# Patient Record
Sex: FEMALE | Race: WHITE | NOT HISPANIC OR LATINO | Employment: FULL TIME | URBAN - METROPOLITAN AREA
[De-identification: names, ages, dates, MRNs, and addresses within clinical notes are randomized per-mention and may not be internally consistent; named-entity substitution may affect disease eponyms.]

---

## 2017-05-11 ENCOUNTER — HOSPITAL ENCOUNTER (OUTPATIENT)
Dept: RADIOLOGY | Facility: HOSPITAL | Age: 27
Discharge: HOME | End: 2017-05-11
Attending: NURSE PRACTITIONER
Payer: COMMERCIAL

## 2017-05-11 DIAGNOSIS — R20.2 PARESTHESIA OF FOOT, BILATERAL: ICD-10-CM

## 2017-05-11 DIAGNOSIS — R20.2 PARESTHESIA OF FOOT, BILATERAL: Primary | ICD-10-CM

## 2017-05-11 PROCEDURE — 72100 X-RAY EXAM L-S SPINE 2/3 VWS: CPT | Mod: 26,,, | Performed by: RADIOLOGY

## 2017-05-11 PROCEDURE — 72050 X-RAY EXAM NECK SPINE 4/5VWS: CPT | Mod: 26,,, | Performed by: RADIOLOGY

## 2017-05-11 PROCEDURE — 72050 X-RAY EXAM NECK SPINE 4/5VWS: CPT | Mod: TC,PO

## 2017-05-11 PROCEDURE — 72100 X-RAY EXAM L-S SPINE 2/3 VWS: CPT | Mod: TC,PO

## 2017-12-01 ENCOUNTER — HOSPITAL ENCOUNTER (EMERGENCY)
Facility: HOSPITAL | Age: 27
Discharge: HOME | End: 2017-12-01
Attending: EMERGENCY MEDICINE
Payer: COMMERCIAL

## 2017-12-01 DIAGNOSIS — R10.9 RIGHT FLANK PAIN: ICD-10-CM

## 2017-12-01 DIAGNOSIS — N23 RENAL COLIC ON RIGHT SIDE: Primary | ICD-10-CM

## 2017-12-01 LAB
ALBUMIN SERPL BCP-MCNC: 4.8 G/DL
ALP SERPL-CCNC: 94 U/L
ALT SERPL W/O P-5'-P-CCNC: 21 U/L
ANION GAP SERPL CALC-SCNC: 14 MMOL/L
AST SERPL-CCNC: 24 U/L
B-HCG UR QL: NEGATIVE
BACTERIA #/AREA URNS AUTO: ABNORMAL /HPF
BASOPHILS # BLD AUTO: 0.04 K/UL
BASOPHILS NFR BLD: 0.6 %
BILIRUB SERPL-MCNC: 1.4 MG/DL
BILIRUB UR QL STRIP: NEGATIVE
BUN SERPL-MCNC: 12 MG/DL
CALCIUM SERPL-MCNC: 10.3 MG/DL
CHLORIDE SERPL-SCNC: 103 MMOL/L
CLARITY UR REFRACT.AUTO: CLEAR
CO2 SERPL-SCNC: 24 MMOL/L
COLOR UR AUTO: YELLOW
CREAT SERPL-MCNC: 0.7 MG/DL
DIFFERENTIAL METHOD: ABNORMAL
EOSINOPHIL # BLD AUTO: 0.1 K/UL
EOSINOPHIL NFR BLD: 1.2 %
ERYTHROCYTE [DISTWIDTH] IN BLOOD BY AUTOMATED COUNT: 11.9 %
EST. GFR  (AFRICAN AMERICAN): >60 ML/MIN/1.73 M^2
EST. GFR  (NON AFRICAN AMERICAN): >60 ML/MIN/1.73 M^2
GLUCOSE SERPL-MCNC: 80 MG/DL
GLUCOSE UR QL STRIP: NEGATIVE
HCT VFR BLD AUTO: 42.1 %
HGB BLD-MCNC: 14.7 G/DL
HGB UR QL STRIP: ABNORMAL
HYALINE CASTS UR QL AUTO: 0 /LPF
KETONES UR QL STRIP: NEGATIVE
LEUKOCYTE ESTERASE UR QL STRIP: NEGATIVE
LYMPHOCYTES # BLD AUTO: 2.8 K/UL
LYMPHOCYTES NFR BLD: 43 %
MCH RBC QN AUTO: 32.2 PG
MCHC RBC AUTO-ENTMCNC: 34.9 G/DL
MCV RBC AUTO: 92 FL
MICROSCOPIC COMMENT: ABNORMAL
MONOCYTES # BLD AUTO: 0.5 K/UL
MONOCYTES NFR BLD: 8 %
NEUTROPHILS # BLD AUTO: 3 K/UL
NEUTROPHILS NFR BLD: 47 %
NITRITE UR QL STRIP: NEGATIVE
NON-SQ EPI CELLS #/AREA URNS AUTO: 2 /HPF
PH UR STRIP: 6 [PH] (ref 5–8)
PLATELET # BLD AUTO: 223 K/UL
PMV BLD AUTO: 11.2 FL
POTASSIUM SERPL-SCNC: 3.7 MMOL/L
PROT SERPL-MCNC: 8.4 G/DL
PROT UR QL STRIP: NEGATIVE
RBC # BLD AUTO: 4.56 M/UL
RBC #/AREA URNS AUTO: 0 /HPF (ref 0–4)
SODIUM SERPL-SCNC: 141 MMOL/L
SP GR UR STRIP: <=1.005 (ref 1–1.03)
SQUAMOUS #/AREA URNS AUTO: 0 /HPF
URN SPEC COLLECT METH UR: ABNORMAL
UROBILINOGEN UR STRIP-ACNC: NEGATIVE EU/DL
WBC # BLD AUTO: 6.47 K/UL
WBC #/AREA URNS AUTO: 0 /HPF (ref 0–5)

## 2017-12-01 PROCEDURE — 81025 URINE PREGNANCY TEST: CPT

## 2017-12-01 PROCEDURE — 85025 COMPLETE CBC W/AUTO DIFF WBC: CPT

## 2017-12-01 PROCEDURE — 96376 TX/PRO/DX INJ SAME DRUG ADON: CPT

## 2017-12-01 PROCEDURE — 25000003 PHARM REV CODE 250: Performed by: EMERGENCY MEDICINE

## 2017-12-01 PROCEDURE — 63600175 PHARM REV CODE 636 W HCPCS: Performed by: EMERGENCY MEDICINE

## 2017-12-01 PROCEDURE — 99285 EMERGENCY DEPT VISIT HI MDM: CPT | Mod: 25

## 2017-12-01 PROCEDURE — 96374 THER/PROPH/DIAG INJ IV PUSH: CPT

## 2017-12-01 PROCEDURE — 96361 HYDRATE IV INFUSION ADD-ON: CPT

## 2017-12-01 PROCEDURE — 96375 TX/PRO/DX INJ NEW DRUG ADDON: CPT

## 2017-12-01 PROCEDURE — 81000 URINALYSIS NONAUTO W/SCOPE: CPT

## 2017-12-01 PROCEDURE — 80053 COMPREHEN METABOLIC PANEL: CPT

## 2017-12-01 RX ORDER — MORPHINE SULFATE 2 MG/ML
2 INJECTION, SOLUTION INTRAMUSCULAR; INTRAVENOUS
Status: COMPLETED | OUTPATIENT
Start: 2017-12-01 | End: 2017-12-01

## 2017-12-01 RX ORDER — ONDANSETRON 4 MG/1
4 TABLET, ORALLY DISINTEGRATING ORAL EVERY 6 HOURS PRN
Qty: 12 TABLET | Refills: 0 | Status: SHIPPED | OUTPATIENT
Start: 2017-12-01 | End: 2018-10-05

## 2017-12-01 RX ORDER — HYDROCODONE BITARTRATE AND ACETAMINOPHEN 7.5; 325 MG/1; MG/1
1 TABLET ORAL EVERY 6 HOURS PRN
Qty: 18 TABLET | Refills: 0 | Status: SHIPPED | OUTPATIENT
Start: 2017-12-01 | End: 2018-10-05

## 2017-12-01 RX ORDER — ONDANSETRON 2 MG/ML
4 INJECTION INTRAMUSCULAR; INTRAVENOUS ONCE
Status: COMPLETED | OUTPATIENT
Start: 2017-12-01 | End: 2017-12-01

## 2017-12-01 RX ORDER — KETOROLAC TROMETHAMINE 30 MG/ML
30 INJECTION, SOLUTION INTRAMUSCULAR; INTRAVENOUS
Status: COMPLETED | OUTPATIENT
Start: 2017-12-01 | End: 2017-12-01

## 2017-12-01 RX ORDER — TAMSULOSIN HYDROCHLORIDE 0.4 MG/1
0.4 CAPSULE ORAL DAILY
COMMUNITY
End: 2018-10-05

## 2017-12-01 RX ORDER — ONDANSETRON 4 MG/1
8 TABLET, FILM COATED ORAL 2 TIMES DAILY
COMMUNITY
End: 2018-10-05

## 2017-12-01 RX ORDER — TAMSULOSIN HYDROCHLORIDE 0.4 MG/1
0.4 CAPSULE ORAL DAILY
Qty: 30 CAPSULE | Refills: 0 | Status: SHIPPED | OUTPATIENT
Start: 2017-12-01 | End: 2017-12-31

## 2017-12-01 RX ORDER — OXYCODONE AND ACETAMINOPHEN 5; 325 MG/1; MG/1
1 TABLET ORAL EVERY 4 HOURS PRN
COMMUNITY
End: 2017-12-01

## 2017-12-01 RX ORDER — KETOROLAC TROMETHAMINE 10 MG/1
10 TABLET, FILM COATED ORAL EVERY 6 HOURS PRN
Qty: 16 TABLET | Refills: 0 | Status: SHIPPED | OUTPATIENT
Start: 2017-12-01 | End: 2017-12-17

## 2017-12-01 RX ADMIN — KETOROLAC TROMETHAMINE 30 MG: 30 INJECTION, SOLUTION INTRAMUSCULAR at 08:12

## 2017-12-01 RX ADMIN — Medication 2 MG: at 07:12

## 2017-12-01 RX ADMIN — SODIUM CHLORIDE 1000 ML: 0.9 INJECTION, SOLUTION INTRAVENOUS at 07:12

## 2017-12-01 RX ADMIN — ONDANSETRON 4 MG: 2 INJECTION, SOLUTION INTRAMUSCULAR; INTRAVENOUS at 07:12

## 2017-12-01 RX ADMIN — Medication 2 MG: at 09:12

## 2017-12-02 VITALS
TEMPERATURE: 98 F | SYSTOLIC BLOOD PRESSURE: 118 MMHG | DIASTOLIC BLOOD PRESSURE: 69 MMHG | OXYGEN SATURATION: 99 % | BODY MASS INDEX: 18.5 KG/M2 | WEIGHT: 98 LBS | HEIGHT: 61 IN | RESPIRATION RATE: 18 BRPM | HEART RATE: 71 BPM

## 2017-12-02 NOTE — DISCHARGE INSTRUCTIONS
Return to emergency department for nausea, vomiting, worsening pain, fever, or worsening condition.  Drink plenty of fluids.

## 2017-12-02 NOTE — ED NOTES
Pt c/o R flank and lower back pain since approx 11:30 pm. Pt reports taking Percocet and flomax at approx 1 pm without relief of pain. Hx of kidney stones and lithotripsy. Pt reports that it feels like previous kidney stones except for when she lifts and moves her legs the pain increases. With prior episodes, this did not occur. Pt also reports urinary frequency and nausea.      Level of Consciousness: Patient is awake, alert, oriented to person, place, time, and situation.    Appearance: Pt resting comfortably in stretcher, no acute distress at this time. Clothing appropriately placed and clean. Hygiene is appropriate.   Skin: Skin is warm, dry, and intact. Skin turgor is normal/elastic. Mucous membranes moist. Skin color is normal for ethnicity. No skin breakdown noted.  Musculoskeletal: Moves all extremities well. Full active ROM. No deformities noted. Denies any weakness. Gait steady, ambulates without use of assistive devices. Pain to R flank and lower back worsens with lifting and moving legs.   Respiratory: Airway open and patent. Respirations equal and unlabored. Breath sounds clear to auscultation. Denies any SOB.   Cardiac: Regular rate and rhythm. No peripheral edema noted. Radial and pedal pulses present and normal. Capillary refill is within normal limits. Denies chest pain.    GI: Abdomen soft, non-tender to all quadrants with palpitation. Bowel sounds present and active in all quads. Abdomen symmetric with no distention noted. Denies any V/D. Reports nausea. R flank and lower back pain. Mild R flank tenderness.   Neurological: Symmetrical expressions noted to face. Equal bilateral . Normal sensation reported to all extremities. No obvious neurological deficits noted.   Psychosocial: Speech spontaneous, clear, and coherent. Appropriate to situation. Family at bedside. Pt is calm and cooperative. Appears uncomfortable. Moving from position to position trying to find comfort.      Pt informed of  plan of care, verbalizes understanding, and denies any other questions, complaints, or concerns at this time. Bed in locked in lowest position, siderails up x2, call light within reach.  Will continue to monitor.

## 2017-12-02 NOTE — ED PROVIDER NOTES
Encounter Date: 12/1/2017       History     Chief Complaint   Patient presents with    Flank Pain     c/o right flank      CHIEF COMPLIANT: Flank Pain (c/o right flank )      12/1/2017, 7:25 PM     The history is provided by the patient and mother. Kalli Barr is a 26 y.o. female presenting to the ED for right sided flank pain.  Patient has a history of bilaterally kidney stones.  She is followed by Dr. Lacie Lenz.   Onset of pain:  11:00 am.   Pain is rated as moderate.  Pain is located in the right flank.  Patient has the urge to go the bathroom more frequently.  She denies any dysuria, urgency, or fever.  Is associated with nausea.  It is worsened by raising her right leg.  Better with rest.  Prior treatment includes Flomax and Percocet 5 mg.  Patient denies any fevers, chills, chest pain, chest pressure, shortness of breath.  Patient is currently breast-feeding.    PCP: Minerva West MD  Specialist:             Review of patient's allergies indicates:   Allergen Reactions    Cefaclor Hives     Past Medical History:   Diagnosis Date    Kidney stones     Renal stone     VSD (ventricular septal defect), perimembranous      Past Surgical History:   Procedure Laterality Date    ELBOW FRACTURE SURGERY Right     LITHOTRIPSY       Family History   Problem Relation Age of Onset    No Known Problems Mother     No Known Problems Father     No Known Problems Sister     No Known Problems Brother     No Known Problems Maternal Aunt     No Known Problems Maternal Uncle     No Known Problems Paternal Aunt     No Known Problems Paternal Uncle     Hyperlipidemia Maternal Grandmother     Heart disease Maternal Grandfather     Diabetes Maternal Grandfather     Hyperlipidemia Maternal Grandfather     No Known Problems Paternal Grandmother     No Known Problems Paternal Grandfather     Anemia Neg Hx     Arrhythmia Neg Hx     Asthma Neg Hx     Clotting disorder Neg Hx     Fainting Neg Hx      Heart attack Neg Hx     Heart failure Neg Hx     Hypertension Neg Hx     Stroke Neg Hx     Atrial Septal Defect Neg Hx     Congenital heart disease Neg Hx      Social History   Substance Use Topics    Smoking status: Never Smoker    Smokeless tobacco: Never Used    Alcohol use Yes      Comment: socially     Review of Systems   Constitutional: Negative for fever.   HENT: Negative for sore throat.    Respiratory: Negative for shortness of breath.    Cardiovascular: Negative for chest pain.   Gastrointestinal: Negative for nausea.   Genitourinary: Positive for frequency. Negative for dysuria, hematuria, pelvic pain and urgency.   Musculoskeletal: Positive for back pain (right sided flank pain).   Skin: Negative for rash.   Neurological: Negative for weakness.   Hematological: Does not bruise/bleed easily.       Physical Exam     Initial Vitals [12/01/17 1912]   BP Pulse Resp Temp SpO2   137/80 65 20 98.2 °F (36.8 °C) 100 %      MAP       99         Physical Exam    Nursing note and vitals reviewed.  Constitutional: She appears well-developed and well-nourished.   HENT:   Head: Normocephalic and atraumatic.   Eyes: Conjunctivae and EOM are normal. Pupils are equal, round, and reactive to light.   Neck: Normal range of motion.   Cardiovascular: Normal rate, regular rhythm and normal heart sounds.   Pulmonary/Chest: Breath sounds normal. No respiratory distress.   Abdominal: Soft. Bowel sounds are normal. She exhibits no mass. There is no rebound and no guarding.   Genitourinary:   Genitourinary Comments: Right sided CVA tenderness   Musculoskeletal: Normal range of motion.   Neurological: She is alert and oriented to person, place, and time. She has normal strength. No cranial nerve deficit.   Cranial nerves II-XII intact   Skin: Skin is warm and dry.   Psychiatric: She has a normal mood and affect. Her speech is normal and behavior is normal. Thought content normal.         ED Course   Procedures  Labs Reviewed  "  CBC W/ AUTO DIFFERENTIAL - Abnormal; Notable for the following:        Result Value    MCH 32.2 (*)     All other components within normal limits   COMPREHENSIVE METABOLIC PANEL - Abnormal; Notable for the following:     Total Bilirubin 1.4 (*)     All other components within normal limits   URINALYSIS - Abnormal; Notable for the following:     Specific Gravity, UA <=1.005 (*)     Occult Blood UA 2+ (*)     All other components within normal limits   URINALYSIS MICROSCOPIC - Abnormal; Notable for the following:     Non-Squam Epith 2 (*)     All other components within normal limits   PREGNANCY TEST, URINE RAPID                               ED Course      Vitals:    12/01/17 1912 12/01/17 2031   BP: 137/80 116/70   Pulse: 65 62   Resp: 20    Temp: 98.2 °F (36.8 °C) 98.3 °F (36.8 °C)   TempSrc: Oral Oral   SpO2: 100% 100%   Weight: 44.5 kg (98 lb)    Height: 5' 1" (1.549 m)      Results for orders placed or performed during the hospital encounter of 12/01/17   CBC auto differential   Result Value Ref Range    WBC 6.47 3.90 - 12.70 K/uL    RBC 4.56 4.00 - 5.40 M/uL    Hemoglobin 14.7 12.0 - 16.0 g/dL    Hematocrit 42.1 37.0 - 48.5 %    MCV 92 82 - 98 fL    MCH 32.2 (H) 27.0 - 31.0 pg    MCHC 34.9 32.0 - 36.0 g/dL    RDW 11.9 11.5 - 14.5 %    Platelets 223 150 - 350 K/uL    MPV 11.2 9.2 - 12.9 fL    Gran # 3.0 1.8 - 7.7 K/uL    Lymph # 2.8 1.0 - 4.8 K/uL    Mono # 0.5 0.3 - 1.0 K/uL    Eos # 0.1 0.0 - 0.5 K/uL    Baso # 0.04 0.00 - 0.20 K/uL    Gran% 47.0 38.0 - 73.0 %    Lymph% 43.0 18.0 - 48.0 %    Mono% 8.0 4.0 - 15.0 %    Eosinophil% 1.2 0.0 - 8.0 %    Basophil% 0.6 0.0 - 1.9 %    Differential Method Automated    Comprehensive metabolic panel   Result Value Ref Range    Sodium 141 136 - 145 mmol/L    Potassium 3.7 3.5 - 5.1 mmol/L    Chloride 103 95 - 110 mmol/L    CO2 24 23 - 29 mmol/L    Glucose 80 70 - 110 mg/dL    BUN, Bld 12 6 - 20 mg/dL    Creatinine 0.7 0.5 - 1.4 mg/dL    Calcium 10.3 8.7 - 10.5 mg/dL    " Total Protein 8.4 6.0 - 8.4 g/dL    Albumin 4.8 3.5 - 5.2 g/dL    Total Bilirubin 1.4 (H) 0.1 - 1.0 mg/dL    Alkaline Phosphatase 94 55 - 135 U/L    AST 24 10 - 40 U/L    ALT 21 10 - 44 U/L    Anion Gap 14 8 - 16 mmol/L    eGFR if African American >60.0 >60 mL/min/1.73 m^2    eGFR if non African American >60.0 >60 mL/min/1.73 m^2   Urinalysis   Result Value Ref Range    Specimen UA Urine, Clean Catch     Color, UA Yellow Yellow, Straw, Della    Appearance, UA Clear Clear    pH, UA 6.0 5.0 - 8.0    Specific Gravity, UA <=1.005 (A) 1.005 - 1.030    Protein, UA Negative Negative    Glucose, UA Negative Negative    Ketones, UA Negative Negative    Bilirubin (UA) Negative Negative    Occult Blood UA 2+ (A) Negative    Nitrite, UA Negative Negative    Urobilinogen, UA Negative <2.0 EU/dL    Leukocytes, UA Negative Negative   Pregnancy, urine rapid   Result Value Ref Range    Preg Test, Ur Negative    Urinalysis Microscopic   Result Value Ref Range    RBC, UA 0 0 - 4 /hpf    WBC, UA 0 0 - 5 /hpf    Bacteria, UA None None-Occ /hpf    Squam Epithel, UA 0 /hpf    Non-Squam Epith 2 (A) <1/hpf /hpf    Hyaline Casts, UA 0 0-1/lpf /lpf    Microscopic Comment SEE COMMENT        Imaging Results          X-Ray Abdomen AP 1 View (KUB) (Final result)  Result time 12/01/17 21:01:17    Final result by Lyndon Savage MD (12/01/17 21:01:17)                 Impression:     Unremarkable exam.      Electronically signed by: LYNDON SAVAGE MD  Date:     12/01/17  Time:    21:01              Narrative:    Examination: Abdomen, 1 view.    History:     Abdominal pain.    Findings:     Bowel gas pattern is within normal limits. No suspicious calcifications. There is mild convex left scoliosis, likely positional.                             CT Renal Stone Study ABD Pelvis WO (Final result)  Result time 12/01/17 20:18:24    Final result by Lyndon Savage MD (12/01/17 20:18:24)                 Impression:     4 mm stone proximal left ureter,  with marked hydronephrosis. See above. UPJ or proximal ureteric stenosis not excluded.      Electronically signed by: DRE NELSON MD  Date:     12/01/17  Time:    20:18              Narrative:    Examination: CT of the abdomen and pelvis without contrast.    History:    Unspecified abdominal pain    Findings: A 4 mm stone is present in the proximal right ureter at the level of the L4 vertebral body. There is a marked degree of hydronephrosis, the degree of which is more than expected for the size of the stone, raising the question of underlying UPJ or proximal ureteric stenosis. No perinephric stranding.    Note also made of several small nonobstructing renal stones bilaterally.    The liver, spleen, pancreas and adrenal glands are unremarkable, given lack of IV contrast.    The appendix is unremarkable. Visualized lung bases are clear.    Bladder contour is within normal limits.                            Medications   sodium chloride 0.9% bolus 1,000 mL (0 mLs Intravenous Stopped 12/1/17 2137)   ondansetron injection 4 mg (4 mg Intravenous Given 12/1/17 1930)   morphine injection 2 mg (2 mg Intravenous Given 12/1/17 1938)   ketorolac injection 30 mg (30 mg Intravenous Given 12/1/17 2002)   morphine injection 2 mg (2 mg Intravenous Given 12/1/17 2141)       9:48 PM Reassessment: Dr. Doyle reassessed the pt.  The pt is resting comfortably and is NAD.  Pt states their sx have improved. Discussed test results, shared treatment plan, specific conditions for return, and the need for f/u.  Answered their questions at this time.  Pt understands and agrees to the plan.  The pt has remained hemodynamically stable through ED course and is stable for discharge.     Follow-up Information     Lacie Lenz Jr, MD. Schedule an appointment as soon as possible for a visit in 2 days.    Specialty:  Urology  Contact information:  [address]  [facility] [address] [phone]                     New Prescriptions     HYDROCODONE-ACETAMINOPHEN 7.5-325MG (NORCO) 7.5-325 MG PER TABLET    Take 1 tablet by mouth every 6 (six) hours as needed for Pain.    KETOROLAC (TORADOL) 10 MG TABLET    Take 1 tablet (10 mg total) by mouth every 6 (six) hours as needed for Pain.    ONDANSETRON (ZOFRAN-ODT) 4 MG TBDL    Take 1 tablet (4 mg total) by mouth every 6 (six) hours as needed (nausea).    TAMSULOSIN (FLOMAX) 0.4 MG CP24    Take 1 capsule (0.4 mg total) by mouth once daily.        Discontinued Medications    OXYCODONE-ACETAMINOPHEN (PERCOCET) 5-325 MG PER TABLET    Take 1 tablet by mouth every 4 (four) hours as needed for Pain.         Pre-hypertension/Hypertension: The pt has been informed that they may have pre-hypertension or hypertension based on a blood pressure reading in the ED. I recommend that the pt call the PCP listed on their discharge instructions or a physician of their choice this week to arrange f/u for further evaluation of possible pre-hypertension or hypertension.     Clinical Impression:       ICD-10-CM ICD-9-CM   1. Renal colic on right side - 4 mm stone at L4 N23 788.0   2. Right flank pain R10.9 789.09         Disposition:   Disposition: Discharged  Condition: Stable                        Glendy Doyle,   12/01/17 3534

## 2017-12-02 NOTE — ED NOTES
Pt sitting up in stretcher, reports pain has increased, but still is not as bad as it was when she got here. Will administer morphine prior to discharge. Updated on plan of care. Verbalizes understanding. Questions regarding breastfeeding on prescribed medication answered by MD. No Further questions or concerns at this time.

## 2018-10-05 ENCOUNTER — HOSPITAL ENCOUNTER (EMERGENCY)
Facility: HOSPITAL | Age: 28
Discharge: HOME | End: 2018-10-05
Attending: EMERGENCY MEDICINE
Payer: COMMERCIAL

## 2018-10-05 VITALS
DIASTOLIC BLOOD PRESSURE: 105 MMHG | WEIGHT: 111 LBS | RESPIRATION RATE: 16 BRPM | BODY MASS INDEX: 20.97 KG/M2 | SYSTOLIC BLOOD PRESSURE: 160 MMHG | OXYGEN SATURATION: 99 % | HEART RATE: 88 BPM | TEMPERATURE: 99 F

## 2018-10-05 DIAGNOSIS — Z86.59 HISTORY OF ANXIETY: ICD-10-CM

## 2018-10-05 DIAGNOSIS — Q21.0 VENTRICULAR SEPTAL DEFECT (VSD): ICD-10-CM

## 2018-10-05 DIAGNOSIS — R03.0 ELEVATED BLOOD PRESSURE READING WITHOUT DIAGNOSIS OF HYPERTENSION: Primary | ICD-10-CM

## 2018-10-05 LAB
ALBUMIN SERPL BCP-MCNC: 4.1 G/DL
ALP SERPL-CCNC: 75 U/L
ALT SERPL W/O P-5'-P-CCNC: 12 U/L
ANION GAP SERPL CALC-SCNC: 10 MMOL/L
AST SERPL-CCNC: 17 U/L
BASOPHILS # BLD AUTO: 0.03 K/UL
BASOPHILS NFR BLD: 0.4 %
BILIRUB SERPL-MCNC: 0.6 MG/DL
BUN SERPL-MCNC: 12 MG/DL
CALCIUM SERPL-MCNC: 10 MG/DL
CHLORIDE SERPL-SCNC: 106 MMOL/L
CO2 SERPL-SCNC: 26 MMOL/L
CREAT SERPL-MCNC: 0.7 MG/DL
DIFFERENTIAL METHOD: ABNORMAL
EOSINOPHIL # BLD AUTO: 0.2 K/UL
EOSINOPHIL NFR BLD: 2.6 %
ERYTHROCYTE [DISTWIDTH] IN BLOOD BY AUTOMATED COUNT: 11.9 %
EST. GFR  (AFRICAN AMERICAN): >60 ML/MIN/1.73 M^2
EST. GFR  (NON AFRICAN AMERICAN): >60 ML/MIN/1.73 M^2
GLUCOSE SERPL-MCNC: 98 MG/DL
HCT VFR BLD AUTO: 41.9 %
HGB BLD-MCNC: 15 G/DL
LYMPHOCYTES # BLD AUTO: 3.4 K/UL
LYMPHOCYTES NFR BLD: 46.8 %
MCH RBC QN AUTO: 32.8 PG
MCHC RBC AUTO-ENTMCNC: 35.8 G/DL
MCV RBC AUTO: 92 FL
MONOCYTES # BLD AUTO: 0.6 K/UL
MONOCYTES NFR BLD: 8 %
NEUTROPHILS # BLD AUTO: 3 K/UL
NEUTROPHILS NFR BLD: 42.1 %
PLATELET # BLD AUTO: 204 K/UL
PMV BLD AUTO: 12.4 FL
POTASSIUM SERPL-SCNC: 3.3 MMOL/L
PROT SERPL-MCNC: 7.7 G/DL
RBC # BLD AUTO: 4.58 M/UL
SODIUM SERPL-SCNC: 142 MMOL/L
WBC # BLD AUTO: 7.22 K/UL

## 2018-10-05 PROCEDURE — 80053 COMPREHEN METABOLIC PANEL: CPT

## 2018-10-05 PROCEDURE — 85025 COMPLETE CBC W/AUTO DIFF WBC: CPT

## 2018-10-05 PROCEDURE — 25000003 PHARM REV CODE 250: Performed by: EMERGENCY MEDICINE

## 2018-10-05 PROCEDURE — 99285 EMERGENCY DEPT VISIT HI MDM: CPT | Mod: 25

## 2018-10-05 PROCEDURE — 93010 ELECTROCARDIOGRAM REPORT: CPT | Mod: ,,, | Performed by: INTERNAL MEDICINE

## 2018-10-05 PROCEDURE — 93005 ELECTROCARDIOGRAM TRACING: CPT

## 2018-10-05 PROCEDURE — 99900035 HC TECH TIME PER 15 MIN (STAT)

## 2018-10-05 RX ORDER — ETHYNODIOL DIACETATE AND ETHINYL ESTRADIOL 1 MG-35MCG
1 KIT ORAL DAILY
COMMUNITY
End: 2018-11-08

## 2018-10-05 RX ORDER — HYDROXYZINE PAMOATE 25 MG/1
25 CAPSULE ORAL
Status: COMPLETED | OUTPATIENT
Start: 2018-10-05 | End: 2018-10-05

## 2018-10-05 RX ADMIN — HYDROXYZINE PAMOATE 25 MG: 25 CAPSULE ORAL at 10:10

## 2018-10-06 NOTE — ED PROVIDER NOTES
"Encounter Date: 10/5/2018       History     Chief Complaint   Patient presents with    Hypertension     Patient presents to the emergency department with complaints of hypertension and anxiety.  She states that she just got over a sinus infection and completed a course of antibiotics (Z-percy) and steroids.  She admits to taking a "Motrin Sinus" today at 1400 hours.  The patient also has c/o intermittent palpitation and feeling "short winded".     Patient currently presents to the emergency room with concerns regarding hypertension.  She notes that she has been feeling somewhat anxious and noted an increased heart rate but also admits to recent pseudoephedrine use or a resolving upper respiratory infection.  She denies chest pain. She does have an existing history of anxiety and also notes a history of ventricular septal defect.  There is no history of any heart failure or arrhythmia.          Review of patient's allergies indicates:   Allergen Reactions    Cefaclor Hives     Past Medical History:   Diagnosis Date    Anxiety and depression 5/13/2016    Kidney stones     VSD (ventricular septal defect) 10/5/2018     Past Surgical History:   Procedure Laterality Date    CARDIAC CATHETERIZATION      x 2    ELBOW FRACTURE SURGERY Right     LITHOTRIPSY       Family History   Problem Relation Age of Onset    No Known Problems Mother     No Known Problems Father     No Known Problems Sister     No Known Problems Brother     No Known Problems Maternal Aunt     No Known Problems Maternal Uncle     No Known Problems Paternal Aunt     No Known Problems Paternal Uncle     Hyperlipidemia Maternal Grandmother     Heart disease Maternal Grandfather     Diabetes Maternal Grandfather     Hyperlipidemia Maternal Grandfather     No Known Problems Paternal Grandmother     No Known Problems Paternal Grandfather     Anemia Neg Hx     Arrhythmia Neg Hx     Asthma Neg Hx     Clotting disorder Neg Hx     Fainting " Neg Hx     Heart attack Neg Hx     Heart failure Neg Hx     Hypertension Neg Hx     Stroke Neg Hx     Atrial Septal Defect Neg Hx     Congenital heart disease Neg Hx      Social History     Tobacco Use    Smoking status: Never Smoker    Smokeless tobacco: Never Used   Substance Use Topics    Alcohol use: Yes     Comment: socially    Drug use: No     Review of Systems   Constitutional: Negative for chills and fever.   HENT: Negative for congestion and rhinorrhea.    Respiratory: Negative for cough, chest tightness, shortness of breath and wheezing.    Cardiovascular: Positive for palpitations. Negative for chest pain and leg swelling.   Gastrointestinal: Negative for abdominal pain, constipation, diarrhea, nausea and vomiting.   Genitourinary: Negative for dysuria, frequency, urgency, vaginal bleeding and vaginal discharge.   Skin: Negative for color change and rash.   Allergic/Immunologic: Negative for immunocompromised state.   Neurological: Negative for dizziness, weakness and numbness.   Hematological: Negative for adenopathy. Does not bruise/bleed easily.   Psychiatric/Behavioral: The patient is nervous/anxious.    All other systems reviewed and are negative.      Physical Exam     Initial Vitals [10/05/18 2140]   BP Pulse Resp Temp SpO2   (!) 190/132 (!) 114 18 99.1 °F (37.3 °C) 97 %      MAP       --         Physical Exam    Nursing note and vitals reviewed.  Constitutional: She appears well-developed and well-nourished. She is not diaphoretic. No distress.   HENT:   Head: Normocephalic and atraumatic.   Right Ear: External ear normal.   Left Ear: External ear normal.   Nose: Nose normal.   Mouth/Throat: Oropharynx is clear and moist.   Eyes: Conjunctivae and EOM are normal. Pupils are equal, round, and reactive to light. No scleral icterus.   Neck: Neck supple. No tracheal deviation present. No JVD present.   Cardiovascular: Regular rhythm, normal heart sounds and intact distal pulses. Tachycardia  present.  Exam reveals no gallop and no friction rub.    No murmur heard.  Pulmonary/Chest: Breath sounds normal. No respiratory distress. She has no wheezes. She has no rhonchi. She has no rales.   Abdominal: Soft. Bowel sounds are normal. She exhibits no distension. There is no tenderness.   Musculoskeletal: Normal range of motion. She exhibits no edema.   Neurological: She is alert and oriented to person, place, and time. She has normal strength. No cranial nerve deficit or sensory deficit.   Skin: Skin is warm and dry. No rash noted.   Psychiatric: She has a normal mood and affect. Her behavior is normal.         ED Course   Procedures  Labs Reviewed   CBC W/ AUTO DIFFERENTIAL - Abnormal; Notable for the following components:       Result Value    MCH 32.8 (*)     All other components within normal limits   COMPREHENSIVE METABOLIC PANEL - Abnormal; Notable for the following components:    Potassium 3.3 (*)     All other components within normal limits     EKG Readings: (Independently Interpreted)   Initial Reading: No STEMI. Rhythm: Normal Sinus Rhythm. Heart Rate: 87. Ectopy: No Ectopy. Conduction: Normal. Axis: Normal.       Imaging Results          X-Ray Chest PA And Lateral (Final result)  Result time 10/05/18 22:20:43    Final result by Jamaal Henderson MD (10/05/18 22:20:43)                 Impression:      Normal chest x-ray.      Electronically signed by: Jamaal Henderson MD  Date:    10/05/2018  Time:    22:20             Narrative:    EXAMINATION:  XR CHEST PA AND LATERAL    CLINICAL HISTORY:  Chest Pain;    FINDINGS:  No prior study.  Normal size heart.  No congestion.  Lungs are clear.                                 Medical Decision Making:   ED Management:  All findings were reviewed with the patient/family in detail along with the diagnosis of HBP and anxieety.  I see no indication of an emergent process beyond that addressed during our encounter but have duly counseled the patient/family regarding the  need for prompt follow-up as well as the indications that should prompt immediate return to the emergency room should new or worrisome developments occur.  The patient/family communicates understanding of all this information and all remaining questions and concerns were addressed at this time.                          Clinical Impression:   The primary encounter diagnosis was Elevated blood pressure reading without diagnosis of hypertension. Diagnoses of Ventricular septal defect (VSD) and History of anxiety were also pertinent to this visit.                             Enrique Barney MD  10/07/18 4624

## 2018-10-16 ENCOUNTER — OFFICE VISIT (OUTPATIENT)
Dept: CARDIOLOGY | Facility: CLINIC | Age: 28
End: 2018-10-16
Payer: COMMERCIAL

## 2018-10-16 ENCOUNTER — CLINICAL SUPPORT (OUTPATIENT)
Dept: CARDIOLOGY | Facility: CLINIC | Age: 28
End: 2018-10-16
Payer: COMMERCIAL

## 2018-10-16 DIAGNOSIS — I10 HYPERTENSION: ICD-10-CM

## 2018-10-16 DIAGNOSIS — Q21.0 VSD (VENTRICULAR SEPTAL DEFECT): ICD-10-CM

## 2018-10-16 DIAGNOSIS — Q21.0 VSD (VENTRICULAR SEPTAL DEFECT), PERIMEMBRANOUS: ICD-10-CM

## 2018-10-16 DIAGNOSIS — I10 ESSENTIAL HYPERTENSION: Primary | ICD-10-CM

## 2018-10-16 PROCEDURE — 93320 DOPPLER ECHO COMPLETE: CPT | Mod: S$GLB,,, | Performed by: PEDIATRICS

## 2018-10-16 PROCEDURE — 93303 ECHO TRANSTHORACIC: CPT | Mod: S$GLB,,, | Performed by: PEDIATRICS

## 2018-10-16 PROCEDURE — 93325 DOPPLER ECHO COLOR FLOW MAPG: CPT | Mod: S$GLB,,, | Performed by: PEDIATRICS

## 2018-10-16 PROCEDURE — 99214 OFFICE O/P EST MOD 30 MIN: CPT | Mod: S$GLB,,, | Performed by: PEDIATRICS

## 2018-10-16 PROCEDURE — 93000 ELECTROCARDIOGRAM COMPLETE: CPT | Mod: S$GLB,,, | Performed by: PEDIATRICS

## 2018-10-16 RX ORDER — LISINOPRIL 10 MG/1
10 TABLET ORAL DAILY
COMMUNITY
End: 2018-11-08

## 2018-10-16 NOTE — LETTER
October 16, 2018      Minerva West MD  [address] [facility] [address]           [facility]  [address], [address]  [facility] [address]  Phone: [phone]  Fax: [phone]          Patient: Kalli Barr   MR Number: [id number]   YOB: 1990   Date of Visit: 10/16/2018       Dear Dr. Minerva West:    Thank you for referring Kalli Barr to me for evaluation. Attached you will find relevant portions of my assessment and plan of care.    If you have questions, please do not hesitate to call me. I look forward to following Kalli Barr along with you.    Sincerely,    Lyndon Hunt MD    Enclosure  CC:  No Recipients    If you would like to receive this communication electronically, please contact [email or web address] or [phone] to request more information on [employer] access.    For providers and/or their staff who would like to refer a patient to [facility], please contact us through our one-stop-shop provider referral line, [facility], at [phone].    If you feel you have received this communication in error or would no longer like to receive these types of communications, please e-mail [email or web address]

## 2018-10-16 NOTE — PROGRESS NOTES
"      Dee Ybarra NP.  [facility]          Pediatric and Adult Congenital Heart Disease Clinic:     HP:    Kalli Barr is a 27 y.o. female seen in our Adult with Congenital Heart Disease Clinic at [facility], on Oct 16th, 2018, for evaluation of a residual ventricular septal defect (VSD), and more recently hypertension. History was provided by the patient and her mother. 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  The ASD closed spontaneously, and the VSD became smaller. She underwent cardiac catheterization (in 2003) by Dr Maru Davis which showed a small L to R shunt across the VSD, but no pulmonary hypertension.  She went through two successful pregnancies and has two normal children.  She is here today because of concerns about hypertension.     Apparently, Kalli recently (~ 2 weeks ago) experienced  a "severe headache, minor visual disturbance, facial redness, chest pain and tachycardia". She presented to an ER and was found to have a BP of ~ 210/125 mmHg. She was treated with an anti-anxiety medication (Vistaril), not placed on a BP medication and sent home.  The W/U was negative. ? TFTs.  I do not have the notes from that visit.   She was subsequently seen by you and started on Lisinopril 10 mg qd, which is fine.   Except for that episode,  Kalli reports no problem with: severe headaches, visual changes, lightheadedness or syncope, swallowing disorder, cough, hemoptysis, TORI, difficulty breathing, chest pain, palpitations, or abnormally slow or rapid HRs.  She does have a history of anxiety, and is on  Zoloft (Sertraline) for possible panic attacks and/or social anxiety disorder.  It should be mentioned, that while pregnant, she was admitted to [facility] in [place] for hypertension, and found to have some proteinuria. The W/U was negative.  She has a 6 year old and a 20 mo old.     ROS: She reports no: visual or " hearing problem, seizures, stroke or TIAs, persistent fevers, neurological or psychiatric disorder (the is the H/O anxiety disorder), endocrine condition, DM or thyroid disease, abdominal pain or abnormal bowel movements, pelvic pain or abnormal urination, dermatologic condition, musculoskeletal disease, hematologic or bleeding disorder,  and immunologic condition.  No lipid disorder or arterial disease.   No known intrinsic problem with the lungs, liver or kidneys. There is a H/O kidney stones requiring lithotripsy.  There is a F/H of MIs in males in there 40s.     PE: In general, she is a healthy-appearing non-dysmorphic female in no apparent distress.   WT 50.8 kg (111 lbs 8 ozs).  HT 1.55 m (5'1'').  HR 90 bpm.  O2 sat 99%.  BP in the /94 mmHg and /93 mmHg.  These were repeated and are similar.   HEENT: The eyes, nares, and oropharynx are clear.  Eyelids and conjunctiva are normal without drainage or erythema. Pupils equal and round bilaterally.  The head is normocephalic and atraumatic. There are no bruits. The neck is supple without jugular venous distention or thyroid enlargement. No carotid bruits. CHEST:  The lungs are clear to auscultation bilaterally.  There are no scars on the chest wall. HEART: Normal precordial activity. The first and second heart sounds are normal.  A Gr 2/6 holosystolic murmur at the left mid-sternal border.  No DM. There is no gallop, rub, or click in the supine or standing position. ABD:   It is benign without hepatosplenomegaly or tenderness. Liver is at the RCM down ~2 FB. EXTS:  Pulses are 3+ in all 4 extremities with brisk capillary refill. No clubbing, cyanosis, bruising or edema.  No rashes are present.      ........................................................................................         ECG:   Normal sinus rhythm, Slight RAD is of no consequnce,  Normal voltages and T waves.  No conduction abnormality.  The QTc is normal.    ECHO (Mainly looking  for LVH):  No pericardial effusion. Four normally-related cardiac chambers. No clots or vegetations.  No ASD. Small membraneous VSD (possibly two small ones).  No RVOT or LVOT obstruction.  Pap muscles are normal. No arch obstruction. No MVP. The cardiac valves appear normal.   M-MODE:  LV 4.3 cm. RV 2.0 cm.  PA annulus 2.7 cm.  MPA is slightly enlarged.    Ao annulus 2.2 cm. Ao root 2.6 cm. LA 3.3 cm. Sep 0.8 cm and Pos wall 0.8 cm.  EF 62%.  DOPPLER:  Turbulent jet of 80 mmHg across the highly restricted membraneous VSD.  Mild TR with a jet of 28 mmHg.  Mild PI. Peak pressure drop across the pulmonary valve is 7 mmHg. Trivial MR. No AI. Peak pressure drop across the aortic valve is 5 mmHg.  No evidence for a PDA.      ..............................................................................................      ASSESSMENT: A healthy 27 year old  female with a history of a small VSD.  She presented recently with systemic arterial hypertension  (210/125 mmHg).   Her BPs today are 132/94 mmHg in the RA and 136/93 in the LA. Pulses in all four exts are 3+. ECHO show highly restrictive VSD, normal R-sided pressure and no LVH.   PLAN: 1). I think Lisinopril is fine for her BP. My concern, however, is its potential affect on a fetus.  Kalli is on birth control. However, they are not sure if they wish to have another child. It might be safer to go with another agent like Norvasc.  I will call Nurse Tate to discuss this matter.  2).I have asked Kalli to record more BP measurements, to gain more information. Recall, there is no LVH.  3). Continue with Zoloft 100 mg qd. Maintain on birth control medication. 4). I will discuss with her urologist if she has had a renal U/S with Doppler flow study. 5). RTC in 2-3 weeks Dr Davis for F/U and an exercise stress test to assess BP response. ? Ambulatory BP monitor.  6).   Not sure about her lipid profile.    Thank you for allowing us to partake in the  care of your patient. Sincerely Lyndon Hunt PhD, MD.

## 2018-10-25 DIAGNOSIS — I10 ESSENTIAL HYPERTENSION, MALIGNANT: Primary | ICD-10-CM

## 2018-11-05 NOTE — PROGRESS NOTES
"       Dee Ybarra NP.  [facility]           Pediatric and Adult Congenital Heart Disease Clinic:     HP:    Kalli Barr is a 27 y.o. female seen in our Adult with Congenital Heart Disease Clinic at [facility], [place], operated by [facility], on Nov 8th, 2018, for evaluation of a residual ventricular septal defect (VSD), and more recently hypertension. History was provided by the patient and her mother. 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  The ASD closed spontaneously, and the VSD became smaller. She underwent cardiac catheterization (in 2003) by Dr Maru Davis which showed a small L to R shunt across the VSD, but no pulmonary hypertension.  She went through two successful pregnancies and has two normal children.  She is here today because of concerns about hypertension.      Kalli recently (~ 2 weeks ago) experienced  a "severe headache, minor visual disturbance, facial redness, chest pain and tachycardia". She presented to an ER and was found to have a BP of ~ 210/125 mmHg. She was treated with an anti-anxiety medication (Vistaril), not placed on a BP medication and sent home.  The W/U was negative. ? TFTs.  I do not have the notes from that visit.   She was subsequently seen by you and started on Lisinopril 10 mg qd, which is fine.   Except for that episode,  Kalli reports no problem with: severe headaches, visual changes, lightheadedness or syncope, swallowing disorder, cough, hemoptysis, TORI, difficulty breathing, chest pain, palpitations, or abnormally slow or rapid HRs.  She does have a history of anxiety, and has been on Zoloft (Sertraline) and Xanax  for possible panic attacks and/or social anxiety disorder.  It should be mentioned, that while pregnant, she was admitted to [facility] in [place] for hypertension, and found to have some proteinuria. The W/U was negative.  She has a 6 year old and a 20 mo old.    In the last few weeks, she " has continued to have episodes of high blood pressure. We had been treating her with Amlodipine and HCTZ, but these were DCed.  She was seen by her urologist in  and he ordered a 24 hr urine collection to assess for a pheochromocytoma.  This test was reported to be negative. (I am trying to get the actual numbers). Also, a renal U/S with Doppler flow study was negative.  After consulting with you, we started her on Labetalol 100 mg q12 hrs, which does have alpha 2 receptor blockade. Kalli reports she is doing well since starting the new medication.  NO HAs, flushing, agitation, CP or abnormally rapid HRs.      ROS: She reports no: visual or hearing problem, seizures, stroke or TIAs, persistent fevers, neurological or psychiatric disorder (the is the H/O anxiety disorder), endocrine condition, DM or thyroid disease, abdominal pain or abnormal bowel movements, pelvic pain or abnormal urination, dermatologic condition, musculoskeletal disease, hematologic or bleeding disorder,  and immunologic condition.  No lipid disorder or arterial disease.   No known intrinsic problem with the lungs, liver or kidneys. There is a H/O kidney stones requiring lithotripsy.  There is a F/H of MIs in males in there 40s.      PE: In general, she is a healthy-appearing non-dysmorphic female in no apparent distress.   WT 49.6 kg (1091/2 lbs).  HT 1.575 m (5'2'').  HR 77 bpm.  O2 sat 99%.  BP in the /74 mmHg. HEENT: The eyes, nares, and oropharynx are clear.  Eyelids and conjunctiva are normal without drainage or erythema. Pupils equal and round bilaterally.  The head is normocephalic and atraumatic. There are no bruits. The neck is supple without jugular venous distention or thyroid enlargement. No carotid bruits. CHEST:  The lungs are clear to auscultation bilaterally.  There are no scars on the chest wall. HEART: Normal precordial activity. The first and second heart sounds are normal.  A Gr 2/6 holosystolic murmur at the left  lower-sternal border.  No DM. There is no gallop, rub, or click in the supine or standing position. ABD:   It is benign without hepatosplenomegaly or tenderness. Liver is at the RCM down ~2 FB. EXTS:  Pulses are 3+ in all 4 extremities with brisk capillary refill. No clubbing, cyanosis, bruising or edema.  No rashes are present.        ........................................................................................           ECG:   Normal sinus rhythm, Slight RAD is of no consequnce,  Normal voltages and T waves.  No conduction abnormality.  The QTc is normal.     ECHO (From last visit and mainly looking for LVH):  No pericardial effusion. Four normally-related cardiac chambers. No clots or vegetations.  No ASD. Small membraneous VSD (possibly two small ones).  No RVOT or LVOT obstruction.  Pap muscles are normal. No arch obstruction. No MVP. The cardiac valves appear normal.  M-MODE:  LV 4.3 cm. RV 2.0 cm.  PA annulus 2.7 cm.  MPA is slightly enlarged. Ao annulus 2.2 cm. Ao root 2.6 cm. LA 3.3 cm. Sep 0.8 cm and Pos wall 0.8 cm.  EF 62%.  DOPPLER:  Turbulent jet of 80 mmHg across the highly restricted membraneous VSD.  Mild TR with a jet of 28 mmHg.  Mild PI. Peak pressure drop across the pulmonary valve is 7 mmHg. Trivial MR. No AI. Peak pressure drop across the aortic valve is 5 mmHg.  No evidence for a PDA.        ..............................................................................................        ASSESSMENT: A healthy 27 year old  female with a history of a small VSD.  She presented recently with severe systemic arterial hypertension  (210/125 mmHg).   Her BP today is 111/74 mmHg in the RA on Labetalol 100 mg q12 hrs. Pulses in all four exts are 3+. Previous ECHO show highly restrictive VSD, normal R-sided pressure and no LVH. PLAN: 1).Continue with Lebatalol at current dose.  4).  Renal U/S with Doppler flow study reported as negative.  5) Antibiotics for dental procedure.  It  is debated if this is required. 6). Consider W/U for hyper-aldosterone syndrome.  6). RTC in 3-4 months.  Thank you for allowing us to partake in the care of your patient. Sincerely Lyndon Hunt PhD, MD.

## 2018-11-08 ENCOUNTER — OFFICE VISIT (OUTPATIENT)
Dept: CARDIOLOGY | Facility: CLINIC | Age: 28
End: 2018-11-08
Payer: COMMERCIAL

## 2018-11-08 VITALS
OXYGEN SATURATION: 100 % | DIASTOLIC BLOOD PRESSURE: 74 MMHG | BODY MASS INDEX: 20.13 KG/M2 | SYSTOLIC BLOOD PRESSURE: 111 MMHG | WEIGHT: 109.38 LBS | HEART RATE: 77 BPM | HEIGHT: 62 IN

## 2018-11-08 DIAGNOSIS — I10 ESSENTIAL HYPERTENSION: Primary | ICD-10-CM

## 2018-11-08 DIAGNOSIS — Q21.0 VSD (VENTRICULAR SEPTAL DEFECT): ICD-10-CM

## 2018-11-08 PROCEDURE — 99213 OFFICE O/P EST LOW 20 MIN: CPT | Mod: S$GLB,,, | Performed by: PEDIATRICS

## 2018-11-08 PROCEDURE — 3008F BODY MASS INDEX DOCD: CPT | Mod: CPTII,S$GLB,, | Performed by: PEDIATRICS

## 2018-11-08 PROCEDURE — 93000 ELECTROCARDIOGRAM COMPLETE: CPT | Mod: S$GLB,,, | Performed by: PEDIATRICS

## 2018-11-08 RX ORDER — LABETALOL 100 MG/1
100 TABLET, FILM COATED ORAL EVERY 12 HOURS
COMMUNITY
End: 2021-11-08

## 2019-10-28 NOTE — PROGRESS NOTES
HP (10/31/19):    Kalli Barr is a 28 year old Caucasion female seen in our Adult with Congenital Heart Disease Clinic at [facility], on Oct 31, 2019, for evaluation of a residual ventricular septal defect (VSD), and more recently hypertension.  Kalli was born with a moderate-size membraneous VSD and a secundum atrial septal defect (ASD).  The ASD closed spontaneously, and the VSD became smaller. She underwent cardiac catheterization (in 2003) by Dr Maru Davis that showed a small L to R shunt across the VSD and no pulmonary hypertension.  She went through two successful pregnancies and has two normal children.  She is here today as a follow-ups for the VSD and systemic arterial hypertension.      Kalli reports no problem with: severe headaches, visual changes, lightheadedness or syncope, swallowing disorder, cough, hemoptysis, TORI, difficulty breathing, chest pain, palpitations, or abnormally slow or rapid HRs.  She does have a history of anxiety, and has been on Zoloft (Sertraline) and Xanax  for possible panic attacks and/or social anxiety disorder.  It should be mentioned, that while pregnant, she was admitted to [facility] in [place] for hypertension, and found to have some proteinuria. The W/U was negative.  She has an ~ 7 year old and an ~ 2 year old.     In the past, she was seen by her urologist in  and he ordered a 24 hr urine collection to assess for pheochromocytoma. This test was reported to be negative.  Also, a renal U/S with Doppler flow study was negative.  We started her on Labetalol 100 mg q12 hrs, which does have alpha 2 receptor blockade. Kalli reports she is doing well since starting the new medication.  No recent problems with: HAs, flushing, agitation, CP or abnormally rapid or slow HRs.  She does report that she is taking the Labetalol only once a day.     ROS: No problems with:  visual or hearing, seizures, stroke or TIAs, persistent  fevers, neurological or psychiatric disorder (there is the H/O anxiety), endocrine condition, DM or thyroid disease, abdominal pain or abnormal bowel movements, pelvic pain or abnormal urination, dermatologic condition, musculoskeletal disease, hematologic or bleeding disorder, or immunologic condition.  No lipid disorder or arterial disease.   No known intrinsic problem with the lungs, liver or kidneys. There is a H/O kidney stones requiring lithotripsy.  No neuromuscular condition.  There is a F/H of MIs in males in there 40s.      PE: In general, she is a healthy-appearing non-dysmorphic female in no apparent distress.   WT 54.2 kg (119 lbs).  HT 1.575 m (5'2'').  HR 78 bpm.  O2 sat 99%.  BP in the /84 mmHg. HEENT: The eyes, nares, and oropharynx are clear.  Eyelids and conjunctiva are normal without drainage or erythema. Pupils equal and round bilaterally.  The head is normocephalic and atraumatic. There are no bruits. The neck is supple without jugular venous distention or thyroid enlargement. No carotid bruits. CHEST:  The lungs are clear to auscultation bilaterally.  There are no scars on the chest wall. HEART: Normal precordial activity. The first and second heart sounds are normal.  A Gr 2/6 holosystolic murmur at the left lower-sternal border.  No DM. There is no gallop, rub, or click in the supine or standing position. ABD:   It is benign without hepatosplenomegaly or tenderness. Liver is at the RCM down ~2 FB. EXTS:  Pulses are 2+ in all 4 extremities with brisk capillary refill. No clubbing, cyanosis, bruising or edema.  No rashes are present.        ........................................................................................           ECG:   Normal sinus rhythm, at 74 bpm. Slight RAD is of no consequnce,  Normal voltages and T waves.  No conduction abnormality.  The QTc is 441 ms.     ECHO:  No pericardial effusion. Four normally-related cardiac chambers. No clots or vegetations.  No  ASD. Small (2-3 mm) membraneous VSD (possibly there are two small ones).  No RVOT or LVOT obstruction.  Pap muscles are normal. No arch obstruction. No MVP. The cardiac valves appear normal. M-MODE:  LV 4.6 cm. RV 2.3 cm. Main  PA is 3.2 cm (slightly enlarged). Ao annulus 2.2 cm. Ao root 3.1 cm. LA 3.1 cm. Sep 1.0 cm and Pos wall 0.9 cm.  EF 56 %.  DOPPLER:  Turbulent jet of 113 mmHg across the highly restricted membraneous VSD.  Mild TR with a jet of 16 mmHg.  Mild PI. Peak pressure drop across the pulmonary valve is 7 mmHg. Trivial MR. Trivial AI. (P 1/2 700 ms).  Peak pressure drop across the aortic valve is 5 mmHg.  No evidence for a PDA.        ..............................................................................................        ASSESSMENT: A healthy 28 year old  female with a history of a small VSD.  She has essential systemic arterial hypertension and is on Labetalol 100 mg qd. A W/U was negative.  Pulses in all four exts are 2+.  ECHO show a highly restrictive VSD, normal R-sided pressure and no LVH by ECHO.   PLAN: 1).Continue with Lebatalol at current dose. 2) Antibiotics for dental procedure.  I am aware of the AAP suggestion. It is debated if this is required  3). RTC in one year.  Thank you for allowing us to partake in the care of your patient. Sincerely Lyndon Hunt PhD, MD.         Positioning (Leave Blank If You Do Not Want): The patient was placed in a comfortable position exposing the surgical site.

## 2019-10-31 ENCOUNTER — CLINICAL SUPPORT (OUTPATIENT)
Dept: CARDIOLOGY | Facility: CLINIC | Age: 29
End: 2019-10-31
Payer: COMMERCIAL

## 2019-10-31 ENCOUNTER — OFFICE VISIT (OUTPATIENT)
Dept: CARDIOLOGY | Facility: CLINIC | Age: 29
End: 2019-10-31
Payer: COMMERCIAL

## 2019-10-31 VITALS
OXYGEN SATURATION: 99 % | DIASTOLIC BLOOD PRESSURE: 84 MMHG | HEART RATE: 78 BPM | BODY MASS INDEX: 21.97 KG/M2 | SYSTOLIC BLOOD PRESSURE: 127 MMHG | HEIGHT: 62 IN | WEIGHT: 119.38 LBS

## 2019-10-31 DIAGNOSIS — I10 ESSENTIAL HYPERTENSION: ICD-10-CM

## 2019-10-31 DIAGNOSIS — Q21.0 VSD (VENTRICULAR SEPTAL DEFECT): Primary | ICD-10-CM

## 2019-10-31 DIAGNOSIS — I10 HYPERTENSION, UNSPECIFIED TYPE: ICD-10-CM

## 2019-10-31 DIAGNOSIS — Q21.0 VSD (VENTRICULAR SEPTAL DEFECT): ICD-10-CM

## 2019-10-31 PROCEDURE — 3079F DIAST BP 80-89 MM HG: CPT | Mod: CPTII,S$GLB,, | Performed by: PEDIATRICS

## 2019-10-31 PROCEDURE — 3008F BODY MASS INDEX DOCD: CPT | Mod: CPTII,S$GLB,, | Performed by: PEDIATRICS

## 2019-10-31 PROCEDURE — 93000 ELECTROCARDIOGRAM COMPLETE: CPT | Mod: 51,S$GLB,, | Performed by: PEDIATRICS

## 2019-10-31 PROCEDURE — 3008F PR BODY MASS INDEX (BMI) DOCUMENTED: ICD-10-PCS | Mod: CPTII,S$GLB,, | Performed by: PEDIATRICS

## 2019-10-31 PROCEDURE — 99214 PR OFFICE/OUTPT VISIT, EST, LEVL IV, 30-39 MIN: ICD-10-PCS | Mod: 25,S$GLB,, | Performed by: PEDIATRICS

## 2019-10-31 PROCEDURE — 93303 ECHO TRANSTHORACIC: CPT | Mod: 51,S$GLB,, | Performed by: PEDIATRICS

## 2019-10-31 PROCEDURE — 93325 DOPPLER ECHO COLOR FLOW MAPG: CPT | Mod: S$GLB,,, | Performed by: PEDIATRICS

## 2019-10-31 PROCEDURE — 3074F PR MOST RECENT SYSTOLIC BLOOD PRESSURE < 130 MM HG: ICD-10-PCS | Mod: CPTII,S$GLB,, | Performed by: PEDIATRICS

## 2019-10-31 PROCEDURE — 99214 OFFICE O/P EST MOD 30 MIN: CPT | Mod: 25,S$GLB,, | Performed by: PEDIATRICS

## 2019-10-31 PROCEDURE — 93320 PR DOPPLER ECHO HEART,COMPLETE: ICD-10-PCS | Mod: S$GLB,,, | Performed by: PEDIATRICS

## 2019-10-31 PROCEDURE — 3074F SYST BP LT 130 MM HG: CPT | Mod: CPTII,S$GLB,, | Performed by: PEDIATRICS

## 2019-10-31 PROCEDURE — 3079F PR MOST RECENT DIASTOLIC BLOOD PRESSURE 80-89 MM HG: ICD-10-PCS | Mod: CPTII,S$GLB,, | Performed by: PEDIATRICS

## 2019-10-31 PROCEDURE — 93303 PR ECHO XTHORACIC,CONG A2M,COMPLETE: ICD-10-PCS | Mod: 51,S$GLB,, | Performed by: PEDIATRICS

## 2019-10-31 PROCEDURE — 93325 PR DOPPLER COLOR FLOW VELOCITY MAP: ICD-10-PCS | Mod: S$GLB,,, | Performed by: PEDIATRICS

## 2019-10-31 PROCEDURE — 93320 DOPPLER ECHO COMPLETE: CPT | Mod: S$GLB,,, | Performed by: PEDIATRICS

## 2019-10-31 PROCEDURE — 93000 PR ELECTROCARDIOGRAM, COMPLETE: ICD-10-PCS | Mod: 51,S$GLB,, | Performed by: PEDIATRICS

## 2021-11-04 RX ORDER — LABETALOL 100 MG/1
TABLET, FILM COATED ORAL
COMMUNITY
End: 2021-11-08

## 2021-11-04 RX ORDER — NYSTATIN 100000 U/G
CREAM TOPICAL
COMMUNITY

## 2021-11-04 RX ORDER — VALACYCLOVIR HYDROCHLORIDE 500 MG/1
TABLET, FILM COATED ORAL
COMMUNITY
Start: 2020-09-16

## 2021-11-04 RX ORDER — LABETALOL 100 MG/1
100 TABLET, FILM COATED ORAL
COMMUNITY
Start: 2020-11-20 | End: 2021-11-08

## 2021-11-04 RX ORDER — SERTRALINE HYDROCHLORIDE 100 MG/1
100 TABLET, FILM COATED ORAL
COMMUNITY
Start: 2021-01-11 | End: 2021-11-08

## 2021-11-04 RX ORDER — SERTRALINE HYDROCHLORIDE 50 MG/1
TABLET, FILM COATED ORAL
COMMUNITY

## 2021-11-04 RX ORDER — HYDROCHLOROTHIAZIDE 12.5 MG/1
TABLET ORAL
COMMUNITY
Start: 2020-11-20

## 2021-11-04 RX ORDER — ALPRAZOLAM 0.5 MG/1
0.5 TABLET ORAL DAILY PRN
COMMUNITY
Start: 2020-11-20 | End: 2021-11-20

## 2023-07-04 ENCOUNTER — HOSPITAL ENCOUNTER (EMERGENCY)
Facility: HOSPITAL | Age: 33
Discharge: HOME | End: 2023-07-04
Attending: EMERGENCY MEDICINE
Payer: COMMERCIAL

## 2023-07-04 VITALS
HEIGHT: 62 IN | BODY MASS INDEX: 22.87 KG/M2 | DIASTOLIC BLOOD PRESSURE: 78 MMHG | SYSTOLIC BLOOD PRESSURE: 106 MMHG | RESPIRATION RATE: 19 BRPM | WEIGHT: 124.25 LBS | HEART RATE: 88 BPM | TEMPERATURE: 98 F | OXYGEN SATURATION: 99 %

## 2023-07-04 DIAGNOSIS — R10.9 RIGHT FLANK PAIN: ICD-10-CM

## 2023-07-04 DIAGNOSIS — N20.0 NEPHROLITHIASIS: Primary | ICD-10-CM

## 2023-07-04 LAB
ALBUMIN SERPL BCP-MCNC: 4.1 G/DL (ref 3.5–5.2)
ALP SERPL-CCNC: 70 U/L (ref 55–135)
ALT SERPL W/O P-5'-P-CCNC: 25 U/L (ref 10–44)
ANION GAP SERPL CALC-SCNC: 11 MMOL/L (ref 8–16)
AST SERPL-CCNC: 26 U/L (ref 10–40)
B-HCG UR QL: NEGATIVE
BASOPHILS # BLD AUTO: 0.04 K/UL (ref 0–0.2)
BASOPHILS NFR BLD: 0.4 % (ref 0–1.9)
BILIRUB SERPL-MCNC: 0.4 MG/DL (ref 0.1–1)
BILIRUB UR QL STRIP: NEGATIVE
BUN SERPL-MCNC: 15 MG/DL (ref 6–20)
CALCIUM SERPL-MCNC: 9.2 MG/DL (ref 8.7–10.5)
CHLORIDE SERPL-SCNC: 110 MMOL/L (ref 95–110)
CLARITY UR REFRACT.AUTO: CLEAR
CO2 SERPL-SCNC: 21 MMOL/L (ref 23–29)
COLOR UR AUTO: YELLOW
CREAT SERPL-MCNC: 0.9 MG/DL (ref 0.5–1.4)
DIFFERENTIAL METHOD: ABNORMAL
EOSINOPHIL # BLD AUTO: 0.2 K/UL (ref 0–0.5)
EOSINOPHIL NFR BLD: 1.5 % (ref 0–8)
ERYTHROCYTE [DISTWIDTH] IN BLOOD BY AUTOMATED COUNT: 11.6 % (ref 11.5–14.5)
EST. GFR  (NO RACE VARIABLE): >60 ML/MIN/1.73 M^2
GLUCOSE SERPL-MCNC: 98 MG/DL (ref 70–110)
GLUCOSE UR QL STRIP: NEGATIVE
HCT VFR BLD AUTO: 37 % (ref 37–48.5)
HGB BLD-MCNC: 13.2 G/DL (ref 12–16)
HGB UR QL STRIP: ABNORMAL
IMM GRANULOCYTES # BLD AUTO: 0.03 K/UL (ref 0–0.04)
IMM GRANULOCYTES NFR BLD AUTO: 0.3 % (ref 0–0.5)
KETONES UR QL STRIP: ABNORMAL
LEUKOCYTE ESTERASE UR QL STRIP: NEGATIVE
LYMPHOCYTES # BLD AUTO: 3.5 K/UL (ref 1–4.8)
LYMPHOCYTES NFR BLD: 35.6 % (ref 18–48)
MCH RBC QN AUTO: 34.2 PG (ref 27–31)
MCHC RBC AUTO-ENTMCNC: 35.7 G/DL (ref 32–36)
MCV RBC AUTO: 96 FL (ref 82–98)
MONOCYTES # BLD AUTO: 0.7 K/UL (ref 0.3–1)
MONOCYTES NFR BLD: 7.4 % (ref 4–15)
NEUTROPHILS # BLD AUTO: 5.3 K/UL (ref 1.8–7.7)
NEUTROPHILS NFR BLD: 54.8 % (ref 38–73)
NITRITE UR QL STRIP: NEGATIVE
NRBC BLD-RTO: 0 /100 WBC
PH UR STRIP: 7 [PH] (ref 5–8)
PLATELET # BLD AUTO: 205 K/UL (ref 150–450)
PMV BLD AUTO: 12 FL (ref 9.2–12.9)
POTASSIUM SERPL-SCNC: 3.8 MMOL/L (ref 3.5–5.1)
PROT SERPL-MCNC: 6.8 G/DL (ref 6–8.4)
PROT UR QL STRIP: NEGATIVE
RBC # BLD AUTO: 3.86 M/UL (ref 4–5.4)
SODIUM SERPL-SCNC: 142 MMOL/L (ref 136–145)
SP GR UR STRIP: 1.01 (ref 1–1.03)
URN SPEC COLLECT METH UR: ABNORMAL
UROBILINOGEN UR STRIP-ACNC: <2 EU/DL
WBC # BLD AUTO: 9.75 K/UL (ref 3.9–12.7)

## 2023-07-04 PROCEDURE — 81025 URINE PREGNANCY TEST: CPT | Mod: ER | Performed by: EMERGENCY MEDICINE

## 2023-07-04 PROCEDURE — 81003 URINALYSIS AUTO W/O SCOPE: CPT | Mod: ER | Performed by: EMERGENCY MEDICINE

## 2023-07-04 PROCEDURE — 96375 TX/PRO/DX INJ NEW DRUG ADDON: CPT | Mod: ER

## 2023-07-04 PROCEDURE — 80053 COMPREHEN METABOLIC PANEL: CPT | Mod: ER | Performed by: EMERGENCY MEDICINE

## 2023-07-04 PROCEDURE — 87389 HIV-1 AG W/HIV-1&-2 AB AG IA: CPT | Performed by: EMERGENCY MEDICINE

## 2023-07-04 PROCEDURE — 85025 COMPLETE CBC W/AUTO DIFF WBC: CPT | Mod: ER | Performed by: EMERGENCY MEDICINE

## 2023-07-04 PROCEDURE — 25000003 PHARM REV CODE 250: Mod: ER | Performed by: EMERGENCY MEDICINE

## 2023-07-04 PROCEDURE — 63600175 PHARM REV CODE 636 W HCPCS: Mod: ER | Performed by: EMERGENCY MEDICINE

## 2023-07-04 PROCEDURE — 96361 HYDRATE IV INFUSION ADD-ON: CPT | Mod: ER

## 2023-07-04 PROCEDURE — 96374 THER/PROPH/DIAG INJ IV PUSH: CPT | Mod: ER

## 2023-07-04 PROCEDURE — 86803 HEPATITIS C AB TEST: CPT | Performed by: EMERGENCY MEDICINE

## 2023-07-04 PROCEDURE — 99284 EMERGENCY DEPT VISIT MOD MDM: CPT | Mod: ER,25

## 2023-07-04 RX ORDER — OXYCODONE AND ACETAMINOPHEN 10; 325 MG/1; MG/1
1 TABLET ORAL EVERY 6 HOURS PRN
Qty: 12 TABLET | Refills: 0 | Status: SHIPPED | OUTPATIENT
Start: 2023-07-04 | End: 2023-12-11

## 2023-07-04 RX ORDER — HYDROMORPHONE HYDROCHLORIDE 2 MG/ML
1 INJECTION, SOLUTION INTRAMUSCULAR; INTRAVENOUS; SUBCUTANEOUS
Status: COMPLETED | OUTPATIENT
Start: 2023-07-04 | End: 2023-07-04

## 2023-07-04 RX ORDER — ONDANSETRON 2 MG/ML
4 INJECTION INTRAMUSCULAR; INTRAVENOUS
Status: COMPLETED | OUTPATIENT
Start: 2023-07-04 | End: 2023-07-04

## 2023-07-04 RX ORDER — NAPROXEN 500 MG/1
500 TABLET ORAL 2 TIMES DAILY WITH MEALS
Qty: 60 TABLET | Refills: 0 | Status: SHIPPED | OUTPATIENT
Start: 2023-07-04 | End: 2023-12-11

## 2023-07-04 RX ORDER — TAMSULOSIN HYDROCHLORIDE 0.4 MG/1
0.4 CAPSULE ORAL DAILY
Qty: 30 CAPSULE | Refills: 0 | Status: SHIPPED | OUTPATIENT
Start: 2023-07-04 | End: 2023-12-11

## 2023-07-04 RX ORDER — KETOROLAC TROMETHAMINE 30 MG/ML
10 INJECTION, SOLUTION INTRAMUSCULAR; INTRAVENOUS
Status: COMPLETED | OUTPATIENT
Start: 2023-07-04 | End: 2023-07-04

## 2023-07-04 RX ADMIN — ONDANSETRON 4 MG: 2 INJECTION INTRAMUSCULAR; INTRAVENOUS at 10:07

## 2023-07-04 RX ADMIN — HYDROMORPHONE HYDROCHLORIDE 1 MG: 2 INJECTION INTRAMUSCULAR; INTRAVENOUS; SUBCUTANEOUS at 10:07

## 2023-07-04 RX ADMIN — SODIUM CHLORIDE 1000 ML: 9 INJECTION, SOLUTION INTRAVENOUS at 10:07

## 2023-07-04 RX ADMIN — KETOROLAC TROMETHAMINE 10 MG: 30 INJECTION, SOLUTION INTRAMUSCULAR; INTRAVENOUS at 10:07

## 2023-07-05 LAB
HCV AB SERPL QL IA: NEGATIVE
HEP C VIRUS HOLD SPECIMEN: NORMAL
HIV 1+2 AB+HIV1 P24 AG SERPL QL IA: NEGATIVE

## 2023-07-05 NOTE — ED PROVIDER NOTES
Encounter Date: 7/4/2023       History     Chief Complaint   Patient presents with    Flank Pain     R side flank pain, hx of kidney stones, nausea, onset today      The history is provided by the patient.   Flank Pain  This is a new problem. The current episode started 1 to 2 hours ago. The problem occurs constantly. The problem has not changed since onset.Pertinent negatives include no chest pain, no abdominal pain, no headaches and no shortness of breath. Nothing aggravates the symptoms. Nothing relieves the symptoms. Pt reports hx of kidney stone, c same presentation.     Review of patient's allergies indicates:   Allergen Reactions    Cefaclor Hives     Past Medical History:   Diagnosis Date    Anxiety and depression 5/13/2016    Essential hypertension 8/23/2019 10:25:10 AM    [facility] Historical - Cardiovascular: Hypertension-isolated episode October 2018 - was hospitalized - had low potassium. Follows cards - BP great now on low dose meds    Kidney stones     Pregnant state, incidental 6/13/2016 3:11:10 PM    [facility] Historical - Obstetrical: Pregnant-No Additional Notes    Ventricular septal defect 6/27/2016 3:37:14 PM    [facility] Historical - Unknown: VSD (ventricular septal defect and aortic arch hypoplasia-No Additional Notes    VSD (ventricular septal defect) 10/5/2018     Past Surgical History:   Procedure Laterality Date    CARDIAC CATHETERIZATION      x 2    ELBOW FRACTURE SURGERY Right     LITHOTRIPSY       Family History   Problem Relation Age of Onset    No Known Problems Mother     No Known Problems Father     No Known Problems Sister     No Known Problems Brother     No Known Problems Maternal Aunt     No Known Problems Maternal Uncle     No Known Problems Paternal Aunt     No Known Problems Paternal Uncle     Hyperlipidemia Maternal Grandmother     Heart disease Maternal Grandfather     Diabetes Maternal Grandfather     Hyperlipidemia Maternal Grandfather     No Known Problems  Paternal Grandmother     No Known Problems Paternal Grandfather     Anemia Neg Hx     Arrhythmia Neg Hx     Asthma Neg Hx     Clotting disorder Neg Hx     Fainting Neg Hx     Heart attack Neg Hx     Heart failure Neg Hx     Hypertension Neg Hx     Stroke Neg Hx     Atrial Septal Defect Neg Hx     Congenital heart disease Neg Hx      Social History     Tobacco Use    Smoking status: Never    Smokeless tobacco: Never   Substance Use Topics    Alcohol use: Yes     Comment: socially    Drug use: No     Review of Systems   Constitutional:  Negative for chills and fever.   HENT:  Negative for congestion.    Eyes:  Negative for photophobia.   Respiratory:  Negative for shortness of breath.    Cardiovascular:  Negative for chest pain.   Gastrointestinal:  Negative for abdominal pain.   Genitourinary:  Positive for flank pain. Negative for dysuria.   Neurological:  Negative for headaches.   Hematological:  Does not bruise/bleed easily.     Physical Exam     Initial Vitals [07/04/23 2206]   BP Pulse Resp Temp SpO2   129/84 92 20 97.6 °F (36.4 °C) 100 %      MAP       --         Physical Exam    Nursing note and vitals reviewed.  Constitutional: She appears well-developed and well-nourished. She appears distressed.   HENT:   Head: Normocephalic and atraumatic.   Mouth/Throat: Oropharynx is clear and moist.   Eyes: Conjunctivae and EOM are normal. Pupils are equal, round, and reactive to light.   Neck: Neck supple.   Normal range of motion.  Cardiovascular:  Normal rate, regular rhythm and normal heart sounds.           Pulmonary/Chest: Breath sounds normal. No respiratory distress.   Abdominal: Abdomen is soft. Bowel sounds are normal. She exhibits no distension. There is no abdominal tenderness.   Musculoskeletal:         General: Normal range of motion.      Cervical back: Normal range of motion and neck supple.     Neurological: She is alert and oriented to person, place, and time. She has normal strength.   Skin: Skin is  warm and dry.   Psychiatric: She has a normal mood and affect. Thought content normal.       ED Course   Procedures  Labs Reviewed   CBC W/ AUTO DIFFERENTIAL - Abnormal; Notable for the following components:       Result Value    RBC 3.86 (*)     MCH 34.2 (*)     All other components within normal limits    Narrative:     Release to patient->Immediate   COMPREHENSIVE METABOLIC PANEL - Abnormal; Notable for the following components:    CO2 21 (*)     All other components within normal limits    Narrative:     Release to patient->Immediate   URINALYSIS, REFLEX TO URINE CULTURE - Abnormal; Notable for the following components:    Ketones, UA Trace (*)     Occult Blood UA Trace (*)     All other components within normal limits    Narrative:     Specimen Source->Urine   PREGNANCY TEST, URINE RAPID    Narrative:     Specimen Source->Urine   HIV 1 / 2 ANTIBODY   HEPATITIS C ANTIBODY   HEP C VIRUS HOLD SPECIMEN     Results for orders placed or performed during the hospital encounter of 07/04/23   CBC Auto Differential   Result Value Ref Range    WBC 9.75 3.90 - 12.70 K/uL    RBC 3.86 (L) 4.00 - 5.40 M/uL    Hemoglobin 13.2 12.0 - 16.0 g/dL    Hematocrit 37.0 37.0 - 48.5 %    MCV 96 82 - 98 fL    MCH 34.2 (H) 27.0 - 31.0 pg    MCHC 35.7 32.0 - 36.0 g/dL    RDW 11.6 11.5 - 14.5 %    Platelets 205 150 - 450 K/uL    MPV 12.0 9.2 - 12.9 fL    Immature Granulocytes 0.3 0.0 - 0.5 %    Gran # (ANC) 5.3 1.8 - 7.7 K/uL    Immature Grans (Abs) 0.03 0.00 - 0.04 K/uL    Lymph # 3.5 1.0 - 4.8 K/uL    Mono # 0.7 0.3 - 1.0 K/uL    Eos # 0.2 0.0 - 0.5 K/uL    Baso # 0.04 0.00 - 0.20 K/uL    nRBC 0 0 /100 WBC    Gran % 54.8 38.0 - 73.0 %    Lymph % 35.6 18.0 - 48.0 %    Mono % 7.4 4.0 - 15.0 %    Eosinophil % 1.5 0.0 - 8.0 %    Basophil % 0.4 0.0 - 1.9 %    Differential Method Automated    Comprehensive Metabolic Panel   Result Value Ref Range    Sodium 142 136 - 145 mmol/L    Potassium 3.8 3.5 - 5.1 mmol/L    Chloride 110 95 - 110 mmol/L     CO2 21 (L) 23 - 29 mmol/L    Glucose 98 70 - 110 mg/dL    BUN 15 6 - 20 mg/dL    Creatinine 0.9 0.5 - 1.4 mg/dL    Calcium 9.2 8.7 - 10.5 mg/dL    Total Protein 6.8 6.0 - 8.4 g/dL    Albumin 4.1 3.5 - 5.2 g/dL    Total Bilirubin 0.4 0.1 - 1.0 mg/dL    Alkaline Phosphatase 70 55 - 135 U/L    AST 26 10 - 40 U/L    ALT 25 10 - 44 U/L    eGFR >60.0 >60 mL/min/1.73 m^2    Anion Gap 11 8 - 16 mmol/L   Urinalysis, Reflex to Urine Culture Urine, Clean Catch    Specimen: Urine   Result Value Ref Range    Specimen UA Urine, Clean Catch     Color, UA Yellow Yellow, Straw, Della    Appearance, UA Clear Clear    pH, UA 7.0 5.0 - 8.0    Specific Gravity, UA 1.015 1.005 - 1.030    Protein, UA Negative Negative    Glucose, UA Negative Negative    Ketones, UA Trace (A) Negative    Bilirubin (UA) Negative Negative    Occult Blood UA Trace (A) Negative    Nitrite, UA Negative Negative    Urobilinogen, UA <2.0 <2.0 EU/dL    Leukocytes, UA Negative Negative   Pregnancy, urine rapid (UPT)   Result Value Ref Range    Preg Test, Ur Negative             Imaging Results              X-Ray Abdomen AP 1 View (KUB) (Final result)  Result time 07/04/23 23:13:43      Final result by Ruy Mcarthur MD (07/04/23 23:13:43)                   Impression:      As above.      Electronically signed by: Ruy Mcarthur  Date:    07/04/2023  Time:    23:13               Narrative:    EXAMINATION:  XR ABDOMEN AP 1 VIEW    CLINICAL HISTORY:  Unspecified abdominal pain    TECHNIQUE:  AP View(s) of the abdomen was performed.    COMPARISON:  None    FINDINGS:  Bowel gas pattern appears unremarkable.  Small calcifications overlie the renal shadows bilaterally consistent with renal stones.  Lung bases are clear.                                       Medications   sodium chloride 0.9% bolus 1,000 mL 1,000 mL (1,000 mLs Intravenous New Bag 7/4/23 2216)   ketorolac injection 9.999 mg (9.999 mg Intravenous Given 7/4/23 2218)   HYDROmorphone (PF) injection 1 mg (1 mg  Intravenous Given 7/4/23 2218)   ondansetron injection 4 mg (4 mg Intravenous Given 7/4/23 2218)     Medical Decision Making:   Initial Assessment:   Right flank pain c/w hx kidney stone  Differential Diagnosis:   Kidney stone, UTI, MS pain  Clinical Tests:   Lab Tests: Ordered and Reviewed  The following lab test(s) were unremarkable: CBC, Urinalysis, CMP and UPT  Radiological Study: Ordered and Reviewed  ED Management:  IV fluid, analgesia  Prescription management                        Clinical Impression:   Final diagnoses:  [R10.9] Right flank pain  [N20.0] Nephrolithiasis (Primary)        ED Disposition Condition    Discharge Stable          ED Prescriptions       Medication Sig Dispense Start Date End Date Auth. Provider    oxyCODONE-acetaminophen (PERCOCET)  mg per tablet Take 1 tablet by mouth every 6 (six) hours as needed for Pain. 12 tablet 7/4/2023 -- Zachary Barry MD    tamsulosin (FLOMAX) 0.4 mg Cap Take 1 capsule (0.4 mg total) by mouth once daily. 30 capsule 7/4/2023 7/3/2024 Zachary Barry MD    naproxen (NAPROSYN) 500 MG tablet Take 1 tablet (500 mg total) by mouth 2 (two) times daily with meals. 60 tablet 7/4/2023 -- Zachary Barry MD          Follow-up Information       Follow up With Specialties Details Why Contact Info Additional Information    [facility] Call in 3 days As needed [id number] [address] [facility] [id number] [phone] Please park on the [address] side and use the Clinic entrance. Check in on the 3rd floor, to the right.    [facility] - Emergency Dept Emergency Medicine  If symptoms worsen [id number]  [facility] [id number] [phone]              Zachary Barry MD  07/04/23 0925